# Patient Record
Sex: MALE | Race: BLACK OR AFRICAN AMERICAN | NOT HISPANIC OR LATINO | Employment: UNEMPLOYED | URBAN - METROPOLITAN AREA
[De-identification: names, ages, dates, MRNs, and addresses within clinical notes are randomized per-mention and may not be internally consistent; named-entity substitution may affect disease eponyms.]

---

## 2024-06-12 ENCOUNTER — HOSPITAL ENCOUNTER (EMERGENCY)
Facility: HOSPITAL | Age: 34
Discharge: HOME | End: 2024-06-12

## 2024-06-12 VITALS
TEMPERATURE: 98.4 F | RESPIRATION RATE: 16 BRPM | SYSTOLIC BLOOD PRESSURE: 112 MMHG | OXYGEN SATURATION: 100 % | WEIGHT: 190 LBS | HEART RATE: 75 BPM | DIASTOLIC BLOOD PRESSURE: 68 MMHG | HEIGHT: 72 IN | BODY MASS INDEX: 25.73 KG/M2

## 2024-06-12 DIAGNOSIS — K59.00 CONSTIPATION, UNSPECIFIED CONSTIPATION TYPE: Primary | ICD-10-CM

## 2024-06-12 PROCEDURE — 99283 EMERGENCY DEPT VISIT LOW MDM: CPT

## 2024-06-12 PROCEDURE — 99283 EMERGENCY DEPT VISIT LOW MDM: CPT | Performed by: PHYSICIAN ASSISTANT

## 2024-06-12 PROCEDURE — 99282 EMERGENCY DEPT VISIT SF MDM: CPT

## 2024-06-12 RX ORDER — POLYETHYLENE GLYCOL 3350 17 G/17G
17 POWDER, FOR SOLUTION ORAL DAILY
Qty: 3 PACKET | Refills: 0 | Status: SHIPPED | OUTPATIENT
Start: 2024-06-12 | End: 2024-06-15

## 2024-06-12 ASSESSMENT — COLUMBIA-SUICIDE SEVERITY RATING SCALE - C-SSRS
1. IN THE PAST MONTH, HAVE YOU WISHED YOU WERE DEAD OR WISHED YOU COULD GO TO SLEEP AND NOT WAKE UP?: NO
2. HAVE YOU ACTUALLY HAD ANY THOUGHTS OF KILLING YOURSELF?: NO
6. HAVE YOU EVER DONE ANYTHING, STARTED TO DO ANYTHING, OR PREPARED TO DO ANYTHING TO END YOUR LIFE?: NO

## 2024-06-12 NOTE — ED PROVIDER NOTES
HPI   Chief Complaint   Patient presents with    Constipation       HPI This is a 34-year-old male who states his stomach hurt because he was constipated.  Patient states now stomach feels better but he has not pooped yet.  Is only been a day.  No nausea vomiting chest pain shortness of breath cough cold fever numbness tingling to his headache visual symptoms.  He no longer has any abdominal pain.  No urinary symptoms                    Alanna Coma Scale Score: 15                     Patient History   No past medical history on file.  No past surgical history on file.  No family history on file.  Social History     Tobacco Use    Smoking status: Not on file    Smokeless tobacco: Not on file   Substance Use Topics    Alcohol use: Not on file    Drug use: Not on file       Physical Exam   ED Triage Vitals [06/12/24 0347]   Temperature Heart Rate Respirations BP   36.9 °C (98.4 °F) 75 16 112/68      Pulse Ox Temp Source Heart Rate Source Patient Position   100 % Temporal -- Sitting      BP Location FiO2 (%)     Right arm --       Physical Exam    Reviewed family history social history and allergies and were noncontributory to current problem.    Review of systems as noted in history of present illness  otherwise negative. All other systems were reviewed and negative.     PMHX: Chronic conditions: reviewd in EMR, relevant history noted in HPI                Surgeries, hospitalizations: reviewed in EMR , relevant history noted in HPI                Medications: reviewed in EMR, relevant history noted in HPI                Allergies: reviewed in EMR, relevant history noted in HPI      PHYSICAL EXAM:    GENERAL/ CONSTITUTIONAL: Vitals noted, no distress. Alert and oriented  x 3. Non-toxic.  No Drooling or stridor .    HEAD: Normocephalic Atraumatic    EENT: Posterior oropharynx unremarkable. No meningismus. No LAD.  No exudate present.      EYES: PERRLA EOMI     NECK: Supple. Nontender. No midline tenderness.  Full range  of motion    CARDIAC: Regular, rate, rhythm. No murmurs rubs or gallops. No JVD    PULMONARY: Lungs clear bilaterally with good aeration. No wheezes rales or rhonchi. No respiratory distress.     GI: Soft, . Nontender. . Normoactive bowel sounds. No pulsatile masses.  No guarding or rebound    EXTREMITIES/MUSCULOSKELTAL: No peripheral edema. , NVIT, pulses +2 /4 equal. FROM in all extremities and equal.     SKIN: No rash. Intact.     NEURO: No focal neurologic deficits,     PSYCH: appropriate mood and affect    MEDICAL DECISION MAKING:    ED Course & MDM   Diagnoses as of 06/12/24 0618   Constipation, unspecified constipation type     No testing needed at this time.  Will be home-going on [employer]ira[employer]  Medical Decision Making  Home-going on [employer]    Procedure  Procedures     Mere Whittington PA-C  06/12/24 0618